# Patient Record
Sex: FEMALE | Race: OTHER | URBAN - METROPOLITAN AREA
[De-identification: names, ages, dates, MRNs, and addresses within clinical notes are randomized per-mention and may not be internally consistent; named-entity substitution may affect disease eponyms.]

---

## 2022-04-26 ENCOUNTER — TRANSCRIBE ORDER (OUTPATIENT)
Dept: SCHEDULING | Age: 47
End: 2022-04-26

## 2022-04-26 DIAGNOSIS — Z12.31 ENCOUNTER FOR SCREENING MAMMOGRAM FOR MALIGNANT NEOPLASM OF BREAST: Primary | ICD-10-CM

## 2022-11-11 RX ORDER — CETIRIZINE HYDROCHLORIDE 10 MG/1
10 TABLET ORAL EVERY EVENING
COMMUNITY

## 2022-11-11 RX ORDER — VENLAFAXINE HYDROCHLORIDE 225 MG/1
225 TABLET, EXTENDED RELEASE ORAL EVERY EVENING
COMMUNITY

## 2022-11-11 RX ORDER — BUTALBITAL, ACETAMINOPHEN AND CAFFEINE 50; 325; 40 MG/1; MG/1; MG/1
1-2 TABLET ORAL EVERY 4 HOURS PRN
COMMUNITY

## 2022-11-11 RX ORDER — DICLOFENAC SODIUM 75 MG/1
75 TABLET, DELAYED RELEASE ORAL 2 TIMES DAILY PRN
COMMUNITY

## 2022-11-11 RX ORDER — FAMOTIDINE 20 MG/1
20 TABLET, FILM COATED ORAL 2 TIMES DAILY
COMMUNITY

## 2022-11-11 RX ORDER — BUPROPION HYDROCHLORIDE 300 MG/1
300 TABLET ORAL EVERY MORNING
COMMUNITY

## 2022-11-11 RX ORDER — IBUPROFEN 200 MG
200 TABLET ORAL EVERY 6 HOURS PRN
COMMUNITY

## 2022-11-11 NOTE — PERIOP NOTE
Patient verified name and . Order for consent not found in EHR; patient verifies procedure. Type 3 surgery, Phone assessment complete. Orders not received. Labs per surgeon: none  Labs per anesthesia protocol: cbc,bmp - pt unable to go to out pt lab prior to surgery. Patient answered medical/surgical history questions at their best of ability. All prior to admission medications documented in [facility]. Patient instructed to take the following medications the day of surgery according to anesthesia guidelines with a small sip of water: wellbutrin, pepcid. Hold all vitamins 7 days prior to surgery and NSAIDS 5 days prior to surgery. Prescription meds to hold:motrin, diclofenac, mvi  Patient instructed on the following:    > Arrive at [facility], time of arrival to be called the day before by 1700  > NPO after midnight, unless otherwise indicated, including gum, mints, and ice chips  > Responsible adult must drive patient to the hospital, stay during surgery, and patient will need supervision 24 hours after anesthesia  > Use antibacterial soap in shower the night before surgery and on the morning of surgery  > All piercings must be removed prior to arrival.    > Leave all valuables (money and jewelry) at home but bring insurance card and ID on DOS.   > Do not wear make-up, nail polish, lotions, cologne, perfumes, powders, or oil on skin. Artificial nails are not permitted.

## 2022-11-12 NOTE — H&P
Name Dennie Cordoba (81YN, F) ID# [id number]    1975 Service [address]. [facility]  Provider Moinca Stallings MD  Insurance   Med Primary: [address] # : [id number]  Policy/Group # : [id number]  Employer Name : [employer]  Prescription: [address] - Member is eligible. details  Chief Complaint  Preop Major Surgery  Patient's Pharmacies  [facility] [address] [facility]): [phone] [address], [facility], Ph [phone], Fax [phone]  Vitals  None recorded. Allergies  Allergies not reviewed (last reviewed 2022)  PENICILLINS: Hives (Mild to moderate), Itching (Mild to moderate), Rash (Mild to moderate)   SULFA (SULFONAMIDE ANTIBIOTICS): Hives (Mild to moderate), Itching (Mild to moderate)   Medications  Medications not reviewed (last reviewed 2022)  buPROPion HCL  mg 24 hr tablet, extended release  22   filled surescripts  butalbital-acetaminophen-caffeine 50 mg-325 mg-40 mg tablet  22   filled surescripts  diclofenac sodium 75 mg tablet,delayed release  22   filled surescripts  DivigeL 1 mg/gram (0.1 %) transdermal gel packet  Apply 1 packet(s) every day by transdermal route for 30 days. 22   filled surescripts  drospirenone 3 mg-ethinyl estradioL 0.03 mg tablet  Take 1 tablet(s) every day by oral route as directed for 90 days. 10/12/22   filled surescripts  EstroGel 1.25 gram/actuation (0.06%) transdermal gel pump  Apply 1 pump(s) every day by topical route. 21   prescribed Nathalia Gerber MD  ibuprofen 800 mg tablet  Take 1 tablet(s) every 6-8 hours by oral route. 22   prescribed MD Ana Laura Munroe Maintenance Pack 4 mcg vaginal insert  Insert 1 vaginal insert(s) twice a week by vaginal route.   22   prescribed MD Kristina Munroexxy Starter Pack 10 mcg vaginal insert, dose pack  INSERT 1 VAGINAL INSERT (10 MCG) BY VAGINAL ROUTE ONCE DAILY FOR 2 WEEKS THEN 1 INSERT (10 MCG) TWICE WEEKLY FOR DURATION OF USE  09/19/22   prescribed Joseluis Maria MD  metFORMIN 500 mg tablet  TAKE THREE TABLETS BY MOUTH AT BEDTIME  10/06/22   filled surescripts  oxyCODONE 5 mg tablet  Take 1 tablet(s) every 4 hours by oral route. 11/12/22   prescribed Joseluis Maria MD  rizatriptan 10 mg tablet  07/28/22   filled surescripts  topiramate 200 mg tablet  08/30/22   filled surescripts  Ubrelvy  as needed  11/02/22   entered Kayli Kim  venlafaxine  mg tablet,extended release 24 hr  08/30/22   filled surescripts  Zofran 4 mg tablet  Take 1 tablet(s) every 4-6 hours by oral route. 11/12/22   prescribed Joseluis Maria MD  ZyrTEC  11/02/22   entered [address] [place] [address]  None recorded. Problems  Reviewed Problems  Endometriosis of pelvis - Onset: 11/09/2022  Mixed urinary incontinence - Onset: 09/19/2022  Atypical squamous cells of undetermined significance on cervical Papanicolaou smear - Onset: 11/05/2022  Pelvic and perineal pain - Onset: 09/19/2022  Excessive menstruation with irregular cycle - Onset: 11/09/2022  Urinary incontinence - Onset: 09/14/2022  Urge incontinence of urine - Onset: 09/14/2022  Acne - Onset: 01/04/2021  Menopausal syndrome - Onset: 08/29/2020  Morbid obesity - Onset: 08/29/2020  Migraine - Onset: 06/17/2020  Anxiety - Onset: 01/04/2021  Atrophic vaginitis - Onset: 08/29/2020  Polycystic ovary syndrome - Onset: 06/17/2020  GYN History  GYN History not reviewed (last reviewed 10/31/2022)  HPV Vaccine: N.  Date of Last Pap Smear: 01/04/2021. Abnormal Pap: Y. Colposcopy: 01/01/2018. CRYO: N.  LEEP: N. Sexually Active?: Y. Sexual Problems?: N.  STIs/STDs: N.  Current Birth Control Method: BCPs. Age at Menarche: 17.  Menses Monthly: N. Date of LMP: 10/01/2022. Duration of Flow (days): 5. Flow: Moderate. Dysmenorrhea: N. PCOS: Y.  ENDOMETRIOSIS: N.  UTERINE FIBROIDS: N.  Hormone Replacement Therapy: N. Most Recent Mammogram: 01/13/2021.   Chronic Pelvic Pain: N.  Obstetric History  Obstetric History not reviewed (last reviewed 10/31/2022)  TOTAL FULL PRE AB. I AB. S ECTOPICS MULTIPLE LIVING  1 1      1  Family History  Family History not reviewed (last reviewed 02/18/2021)  Mother - Thyroidectomy    - Tremor    - Arthritis    - Congestive heart failure    - Diabetes mellitus  Father - Malignant neoplastic disease  - 2 bn tumors removed,one on skull and one on upper spine  Maternal Uncle - Diabetes mellitus  - x 3  Maternal Grandfather - Myocardial infarction  - Believed  No breast/ovarian/uterine/colon ca. No DVT, CVA, PE.   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  Marriage and Sexuality  How many children do you have?: 1  Substance Use  Do you or have you ever smoked tobacco?: Never smoker  How much tobacco do you smoke?: None  Do you or have you ever used e-cigarettes or vape?: Never used electronic cigarettes  How many years have you smoked tobacco?: 0  Do you or have you ever used smokeless tobacco?: Never used smokeless tobacco  Which illicit or recreational drugs have you used?: No use  What was the date of your most recent tobacco screening?: 01/18/2021  What is your level of alcohol consumption?: None  What is your level of caffeine consumption?: None  Other  Live alone or with others?: with others  Marital status:   Surgical History  Surgical History not reviewed (last reviewed 05/17/2021)  Procedure on shoulder - Dr Naomy An  Sinus Surgery - Nasal  Orthopedic Surgery - 01/15/2015    Kidney stone removal  Past Medical History  Past Medical History not reviewed (last reviewed 05/17/2021)  Acne: Y  Anxiety Disorder: Y  Endometriosis: Y  Headaches: Y - migraine, rx amitripyline Yaw Torres NP @ [facility] 01/2021  History of abnormal pap: Y  Infertility: Y  Polycystic ovary syndrome: Y  Postpartum depression: Y  Notes: Obesity tx'd w/Contrave or adipex since at least 6/19 per notes  Broken R metatarsal in 09/2020  HPI  Pre-op for surgery on 11/15/22  ROS  Patient reports no fever, no night sweats, no significant weight gain, no significant weight loss, no exercise intolerance, no chills, and no malaise. She reports no dry eyes, no vision change, no irritation, and no eye disease/injury. She reports no difficulty hearing and no ear pain. She reports no frequent nosebleeds, no nose problems, and no sinus problems. She reports no sore throat, no bleeding gums, no snoring, no dry mouth, no mouth ulcers, no oral abnormalities, no teeth problems, no ringing in the ears, and no sinusitis. She reports no chest pain, no arm pain on exertion, no shortness of breath when walking, no shortness of breath when lying down, no palpitations, no known heart murmur, and no ankle swelling. She reports no cough, no wheezing, no shortness of breath, no coughing up blood, and no sleep apnea. She reports no abdominal pain, no nausea, no vomiting, no constipation, normal appetite, no diarrhea, not vomiting blood, no dyspepsia, and no GERD. She reports no incontinence, no difficulty urinating, no hematuria, and no increased frequency. She reports no muscle aches, no muscle weakness, no arthralgias/joint pain, no back pain, no swelling in the extremities, no neck pain, no difficulty walking, no cramps, no osteoporosis, and no fractures. She reports no abnormal mole, no jaundice, no rashes, no laceration, no non-healing areas, no changes in hair/nails, no psoriasis, no change in skin color, and no breast lump. She reports no loss of consciousness, no weakness, no numbness, no seizures, no dizziness, no migraines, no headaches, no tremor, no gait dysfunction, and no paralysis.  She reports no depression, no sleep disturbances, feeling safe in a relationship, no alcohol abuse, no anxiety, no hallucinations, no suicidal thoughts, no mood swings, no memory loss, no agitation, no dementia, and no delirium. She reports no fatigue. She reports no swollen glands, no bruising, no excessive bleeding, no anemia, and no phlebitis. She reports no runny nose, no sinus pressure, no itching, no hives, and no frequent sneezing. Physical Exam  Constitutional: General Appearance: healthy-appearing, well-nourished, and well-developed. Level of Distress: NAD. Ambulation: ambulating normally. Psychiatric: Insight: good judgement. Mental Status: normal mood and affect and active and alert. Orientation: to time, place, and person. Memory: recent memory normal and remote memory normal.    Head: Head: normocephalic and atraumatic. Eyes: Lids and Conjunctivae: no discharge or pallor and non-injected. Pupils: PERRLA. Corneas: grossly intact. Fundoscopic: no exudates or hemorrhages and grossly normal except where noted. EOM: EOMI. Lens: clear. Sclerae: non-icteric. Vision: peripheral vision grossly intact and acuity grossly intact. ENMT: Ears: no lesions on external ear. Hearing: no hearing loss. Nose: no lesions on external nose, septal deviation, or nasal discharge and nares patent. Lips, Teeth, and Gums: no mouth or lip ulcers or bleeding gums and normal dentition. Oropharynx: no erythema or exudates and moist mucous membranes and tonsils not enlarged. Neck: Neck: FROM, trachea midline, and no masses. Lymph Nodes: no cervical LAD, supraclavicular LAD, axillary LAD, or inguinal LAD. Thyroid: no enlargement or nodules and non-tender. Lungs: Respiratory effort: no dyspnea. Percussion: no dullness, flatness, or hyperresonance. Auscultation: no wheezing, rales/crackles, or rhonchi and breath sounds normal, good air movement, and CTA except as noted. Cardiovascular: Heart Auscultation: normal S1 and S2; no murmurs, rubs, or gallops; and RRR.  Neck vessels: no carotid bruits. Pulses including femoral / pedal: normal throughout. Abdomen: Bowel Sounds: normal. Inspection and Palpation: no tenderness, guarding, masses, rebound tenderness, or CVA tenderness and soft and non-distended. Hernia: none palpable. Musculoskeletal[de-identified] Motor Strength and Tone: normal tone and motor strength. Joints, Bones, and Muscles: no contractures, malalignment, tenderness, or bony abnormalities and normal movement of all extremities. Extremities: no cyanosis, edema, varicosities, or palpable cord. Neurologic: Gait and Station: normal gait and station. Cranial Nerves: grossly intact. Sensation: grossly intact and monofilament test intact. Reflexes: DTRs 2+ bilaterally throughout. Coordination and Cerebellum: finger-to-nose intact and no tremor. Skin: Inspection and palpation: no rash, lesions, ulcer, induration, nodules, jaundice, or abnormal nevi and good turgor. Nails: normal.    Back: Thoracolumbar Appearance: normal curvature. Assessment / Plan  PROCEDURE: RATLH/BS/A&P/TVT    The risks of the procedure were discussed in detail, including hemorrhage, blood clots, infection, damage to other organs such as bowel, bladder, ureters, nerves and vessels. Also, the possible need for catheter use at home after the procedure was discussed. Pt understands that complications are not anticipated, but that if they should occur then corrective procedures would be performed as indicated including, but not limited to:  transfusion, antibiotics and additional surgical procedures including modification/extension of incision (possible vertical incision), need for prolonged catheter drainage should urologic injury occur & other procedures as indicated. Unanticipated reactions to anesthesia as well as the small possibility of death were all discussed. All of the patient's questions were answered. Time away from work and physical restrictions discussed.  She was encouraged to call if she has additional questions/concerns prior to the procedure. Patient understands, has signed the consent forms, and states wants to proceed with the procedure. 1. Excessive menstruation with irregular cycle  N92.1: Excessive and frequent menstruation with irregular cycle  oxycodone 5 mg tablet - Take 1 tablet(s) every 4 hours by oral route. Qty: 30 tablet(s)     Refills: 0     Pharmacy: [id number]  Zofran 4 mg tablet - Take 1 tablet(s) every 4-6 hours by oral route. Qty: 30 tablet(s)     Refills: 2     Pharmacy: [id number]  ibuprofen 800 mg tablet - Take 1 tablet(s) every 6-8 hours by oral route. Qty: 120 tablet(s)     Refills: 1     Pharmacy: [id number]    2. Endometriosis of pelvis  N80.343: Deep endometriosis of the bilateral pelvic sidewall    3. Pelvic and perineal pain  R10.2: Pelvic and perineal pain    4. Mixed urinary incontinence  N39.46: Mixed incontinence    5. Midline cystocele  N81.11: Cystocele, midline    6.  Herniation of rectum into vagina  N81.6: Rectocele      Return to Office  Lele Garcia MD for PREOP/PO/PP at [facility] on 01/11/2023 at 11:30 AM

## 2022-11-15 ENCOUNTER — ANESTHESIA EVENT (OUTPATIENT)
Dept: SURGERY | Age: 47
End: 2022-11-15
Payer: COMMERCIAL

## 2022-11-15 ENCOUNTER — ANESTHESIA (OUTPATIENT)
Dept: SURGERY | Age: 47
End: 2022-11-15
Payer: COMMERCIAL

## 2022-11-15 ENCOUNTER — HOSPITAL ENCOUNTER (OUTPATIENT)
Age: 47
LOS: 1 days | Discharge: HOME | End: 2022-11-16
Attending: OBSTETRICS & GYNECOLOGY | Admitting: OBSTETRICS & GYNECOLOGY
Payer: COMMERCIAL

## 2022-11-15 PROBLEM — N92.1 EXCESSIVE MENSTRUATION WITH IRREGULAR CYCLE: Status: ACTIVE | Noted: 2022-11-15

## 2022-11-15 LAB
ABO + RH BLD: NORMAL
ANION GAP SERPL CALC-SCNC: 5 MMOL/L (ref 2–11)
APPEARANCE UR: ABNORMAL
BACTERIA URNS QL MICRO: NEGATIVE /HPF
BILIRUB UR QL: NEGATIVE
BLOOD GROUP ANTIBODIES SERPL: NORMAL
BUN SERPL-MCNC: 7 MG/DL (ref 6–23)
CALCIUM SERPL-MCNC: 9.3 MG/DL (ref 8.3–10.4)
CASTS URNS QL MICRO: ABNORMAL /LPF
CHLORIDE SERPL-SCNC: 108 MMOL/L (ref 101–110)
CO2 SERPL-SCNC: 25 MMOL/L (ref 21–32)
COLOR UR: ABNORMAL
CREAT SERPL-MCNC: 0.91 MG/DL (ref 0.6–1)
EPI CELLS #/AREA URNS HPF: ABNORMAL /HPF
ERYTHROCYTE [DISTWIDTH] IN BLOOD BY AUTOMATED COUNT: 12.9 % (ref 11.9–14.6)
GLUCOSE SERPL-MCNC: 95 MG/DL (ref 65–100)
GLUCOSE UR STRIP.AUTO-MCNC: NEGATIVE MG/DL
HCG UR QL: NEGATIVE
HCT VFR BLD AUTO: 43.3 % (ref 35.8–46.3)
HGB BLD-MCNC: 14.6 G/DL (ref 11.7–15.4)
HGB UR QL STRIP: ABNORMAL
KETONES UR QL STRIP.AUTO: ABNORMAL MG/DL
LEUKOCYTE ESTERASE UR QL STRIP.AUTO: NEGATIVE
MCH RBC QN AUTO: 30.3 PG (ref 26.1–32.9)
MCHC RBC AUTO-ENTMCNC: 33.7 G/DL (ref 31.4–35)
MCV RBC AUTO: 89.8 FL (ref 82–102)
NITRITE UR QL STRIP.AUTO: NEGATIVE
NRBC # BLD: 0 K/UL (ref 0–0.2)
PH UR STRIP: 6 [PH] (ref 5–9)
PLATELET # BLD AUTO: 442 K/UL (ref 150–450)
PMV BLD AUTO: 8.4 FL (ref 9.4–12.3)
POTASSIUM SERPL-SCNC: 3.5 MMOL/L (ref 3.5–5.1)
PROT UR STRIP-MCNC: NEGATIVE MG/DL
RBC # BLD AUTO: 4.82 M/UL (ref 4.05–5.2)
RBC #/AREA URNS HPF: ABNORMAL /HPF
SODIUM SERPL-SCNC: 138 MMOL/L (ref 133–143)
SP GR UR REFRACTOMETRY: 1.02 (ref 1–1.02)
SPECIMEN EXP DATE BLD: NORMAL
UROBILINOGEN UR QL STRIP.AUTO: 1 EU/DL (ref 0.2–1)
WBC # BLD AUTO: 8.3 K/UL (ref 4.3–11.1)
WBC URNS QL MICRO: ABNORMAL /HPF

## 2022-11-15 PROCEDURE — 6370000000 HC RX 637 (ALT 250 FOR IP): Performed by: OBSTETRICS & GYNECOLOGY

## 2022-11-15 PROCEDURE — 2580000003 HC RX 258: Performed by: OBSTETRICS & GYNECOLOGY

## 2022-11-15 PROCEDURE — 2580000003 HC RX 258: Performed by: ANESTHESIOLOGY

## 2022-11-15 PROCEDURE — 6360000002 HC RX W HCPCS: Performed by: NURSE ANESTHETIST, CERTIFIED REGISTERED

## 2022-11-15 PROCEDURE — 3600000019 HC SURGERY ROBOT ADDTL 15MIN: Performed by: OBSTETRICS & GYNECOLOGY

## 2022-11-15 PROCEDURE — 81025 URINE PREGNANCY TEST: CPT

## 2022-11-15 PROCEDURE — 2720000010 HC SURG SUPPLY STERILE: Performed by: OBSTETRICS & GYNECOLOGY

## 2022-11-15 PROCEDURE — 51701 INSERT BLADDER CATHETER: CPT

## 2022-11-15 PROCEDURE — 2709999900 HC NON-CHARGEABLE SUPPLY: Performed by: OBSTETRICS & GYNECOLOGY

## 2022-11-15 PROCEDURE — 2500000003 HC RX 250 WO HCPCS: Performed by: NURSE ANESTHETIST, CERTIFIED REGISTERED

## 2022-11-15 PROCEDURE — 3600000009 HC SURGERY ROBOT BASE: Performed by: OBSTETRICS & GYNECOLOGY

## 2022-11-15 PROCEDURE — 2500000003 HC RX 250 WO HCPCS: Performed by: OBSTETRICS & GYNECOLOGY

## 2022-11-15 PROCEDURE — 6370000000 HC RX 637 (ALT 250 FOR IP): Performed by: ANESTHESIOLOGY

## 2022-11-15 PROCEDURE — C1771 REP DEV, URINARY, W/SLING: HCPCS | Performed by: OBSTETRICS & GYNECOLOGY

## 2022-11-15 PROCEDURE — S2900 ROBOTIC SURGICAL SYSTEM: HCPCS | Performed by: OBSTETRICS & GYNECOLOGY

## 2022-11-15 PROCEDURE — 81001 URINALYSIS AUTO W/SCOPE: CPT

## 2022-11-15 PROCEDURE — 3700000000 HC ANESTHESIA ATTENDED CARE: Performed by: OBSTETRICS & GYNECOLOGY

## 2022-11-15 PROCEDURE — 3700000001 HC ADD 15 MINUTES (ANESTHESIA): Performed by: OBSTETRICS & GYNECOLOGY

## 2022-11-15 PROCEDURE — 7100000000 HC PACU RECOVERY - FIRST 15 MIN: Performed by: OBSTETRICS & GYNECOLOGY

## 2022-11-15 PROCEDURE — 88307 TISSUE EXAM BY PATHOLOGIST: CPT

## 2022-11-15 PROCEDURE — 80048 BASIC METABOLIC PNL TOTAL CA: CPT

## 2022-11-15 PROCEDURE — 85027 COMPLETE CBC AUTOMATED: CPT

## 2022-11-15 PROCEDURE — 6360000002 HC RX W HCPCS: Performed by: ANESTHESIOLOGY

## 2022-11-15 PROCEDURE — 86900 BLOOD TYPING SEROLOGIC ABO: CPT

## 2022-11-15 PROCEDURE — 6360000002 HC RX W HCPCS: Performed by: OBSTETRICS & GYNECOLOGY

## 2022-11-15 PROCEDURE — 7100000001 HC PACU RECOVERY - ADDTL 15 MIN: Performed by: OBSTETRICS & GYNECOLOGY

## 2022-11-15 PROCEDURE — C1765 ADHESION BARRIER: HCPCS | Performed by: OBSTETRICS & GYNECOLOGY

## 2022-11-15 PROCEDURE — 51798 US URINE CAPACITY MEASURE: CPT

## 2022-11-15 DEVICE — SYSTEM SLNG POLYPR SGL INCIS DEL DEV FOR STRESS URIN INCONT: Type: IMPLANTABLE DEVICE | Site: VAGINA | Status: FUNCTIONAL

## 2022-11-15 RX ORDER — SODIUM CHLORIDE, SODIUM LACTATE, POTASSIUM CHLORIDE, CALCIUM CHLORIDE 600; 310; 30; 20 MG/100ML; MG/100ML; MG/100ML; MG/100ML
INJECTION, SOLUTION INTRAVENOUS CONTINUOUS
Status: DISCONTINUED | OUTPATIENT
Start: 2022-11-15 | End: 2022-11-15 | Stop reason: HOSPADM

## 2022-11-15 RX ORDER — OXYCODONE HYDROCHLORIDE 5 MG/1
10 TABLET ORAL EVERY 4 HOURS PRN
Status: DISCONTINUED | OUTPATIENT
Start: 2022-11-15 | End: 2022-11-16 | Stop reason: HOSPADM

## 2022-11-15 RX ORDER — ROCURONIUM BROMIDE 10 MG/ML
INJECTION, SOLUTION INTRAVENOUS PRN
Status: DISCONTINUED | OUTPATIENT
Start: 2022-11-15 | End: 2022-11-15 | Stop reason: SDUPTHER

## 2022-11-15 RX ORDER — ACETAMINOPHEN 500 MG
1000 TABLET ORAL EVERY 6 HOURS
Status: DISCONTINUED | OUTPATIENT
Start: 2022-11-15 | End: 2022-11-16 | Stop reason: HOSPADM

## 2022-11-15 RX ORDER — ACETAMINOPHEN 500 MG
1000 TABLET ORAL ONCE
Status: COMPLETED | OUTPATIENT
Start: 2022-11-15 | End: 2022-11-15

## 2022-11-15 RX ORDER — SODIUM CHLORIDE 9 MG/ML
INJECTION, SOLUTION INTRAVENOUS PRN
Status: DISCONTINUED | OUTPATIENT
Start: 2022-11-15 | End: 2022-11-16 | Stop reason: HOSPADM

## 2022-11-15 RX ORDER — SODIUM CHLORIDE 0.9 % (FLUSH) 0.9 %
5-40 SYRINGE (ML) INJECTION EVERY 12 HOURS SCHEDULED
Status: DISCONTINUED | OUTPATIENT
Start: 2022-11-15 | End: 2022-11-15 | Stop reason: HOSPADM

## 2022-11-15 RX ORDER — ONDANSETRON 2 MG/ML
4 INJECTION INTRAMUSCULAR; INTRAVENOUS
Status: DISCONTINUED | OUTPATIENT
Start: 2022-11-15 | End: 2022-11-15 | Stop reason: HOSPADM

## 2022-11-15 RX ORDER — KETAMINE HYDROCHLORIDE 50 MG/ML
INJECTION, SOLUTION, CONCENTRATE INTRAMUSCULAR; INTRAVENOUS PRN
Status: DISCONTINUED | OUTPATIENT
Start: 2022-11-15 | End: 2022-11-15 | Stop reason: SDUPTHER

## 2022-11-15 RX ORDER — PROCHLORPERAZINE EDISYLATE 5 MG/ML
5 INJECTION INTRAMUSCULAR; INTRAVENOUS
Status: DISCONTINUED | OUTPATIENT
Start: 2022-11-15 | End: 2022-11-15 | Stop reason: HOSPADM

## 2022-11-15 RX ORDER — ONDANSETRON 2 MG/ML
INJECTION INTRAMUSCULAR; INTRAVENOUS PRN
Status: DISCONTINUED | OUTPATIENT
Start: 2022-11-15 | End: 2022-11-15 | Stop reason: SDUPTHER

## 2022-11-15 RX ORDER — HYDROMORPHONE HYDROCHLORIDE 1 MG/ML
0.5 INJECTION, SOLUTION INTRAMUSCULAR; INTRAVENOUS; SUBCUTANEOUS EVERY 5 MIN PRN
Status: DISCONTINUED | OUTPATIENT
Start: 2022-11-15 | End: 2022-11-15 | Stop reason: HOSPADM

## 2022-11-15 RX ORDER — SODIUM CHLORIDE 0.9 % (FLUSH) 0.9 %
5-40 SYRINGE (ML) INJECTION PRN
Status: DISCONTINUED | OUTPATIENT
Start: 2022-11-15 | End: 2022-11-16 | Stop reason: HOSPADM

## 2022-11-15 RX ORDER — HYDROMORPHONE HYDROCHLORIDE 1 MG/ML
0.25 INJECTION, SOLUTION INTRAMUSCULAR; INTRAVENOUS; SUBCUTANEOUS EVERY 5 MIN PRN
Status: DISCONTINUED | OUTPATIENT
Start: 2022-11-15 | End: 2022-11-15 | Stop reason: HOSPADM

## 2022-11-15 RX ORDER — ONDANSETRON 2 MG/ML
4 INJECTION INTRAMUSCULAR; INTRAVENOUS EVERY 6 HOURS PRN
Status: DISCONTINUED | OUTPATIENT
Start: 2022-11-15 | End: 2022-11-16 | Stop reason: HOSPADM

## 2022-11-15 RX ORDER — DIPHENHYDRAMINE HYDROCHLORIDE 50 MG/ML
12.5 INJECTION INTRAMUSCULAR; INTRAVENOUS
Status: DISCONTINUED | OUTPATIENT
Start: 2022-11-15 | End: 2022-11-15 | Stop reason: HOSPADM

## 2022-11-15 RX ORDER — SODIUM CHLORIDE 0.9 % (FLUSH) 0.9 %
5-40 SYRINGE (ML) INJECTION EVERY 12 HOURS SCHEDULED
Status: DISCONTINUED | OUTPATIENT
Start: 2022-11-15 | End: 2022-11-16 | Stop reason: HOSPADM

## 2022-11-15 RX ORDER — OXYCODONE HYDROCHLORIDE 5 MG/1
5 TABLET ORAL PRN
Status: COMPLETED | OUTPATIENT
Start: 2022-11-15 | End: 2022-11-15

## 2022-11-15 RX ORDER — NEOSTIGMINE METHYLSULFATE 1 MG/ML
INJECTION, SOLUTION INTRAVENOUS PRN
Status: DISCONTINUED | OUTPATIENT
Start: 2022-11-15 | End: 2022-11-15 | Stop reason: SDUPTHER

## 2022-11-15 RX ORDER — PROPOFOL 10 MG/ML
INJECTION, EMULSION INTRAVENOUS PRN
Status: DISCONTINUED | OUTPATIENT
Start: 2022-11-15 | End: 2022-11-15 | Stop reason: SDUPTHER

## 2022-11-15 RX ORDER — OXYCODONE HYDROCHLORIDE 5 MG/1
10 TABLET ORAL PRN
Status: COMPLETED | OUTPATIENT
Start: 2022-11-15 | End: 2022-11-15

## 2022-11-15 RX ORDER — ONDANSETRON 4 MG/1
4 TABLET, ORALLY DISINTEGRATING ORAL EVERY 8 HOURS PRN
Status: DISCONTINUED | OUTPATIENT
Start: 2022-11-15 | End: 2022-11-16 | Stop reason: HOSPADM

## 2022-11-15 RX ORDER — FENTANYL CITRATE 50 UG/ML
INJECTION, SOLUTION INTRAMUSCULAR; INTRAVENOUS PRN
Status: DISCONTINUED | OUTPATIENT
Start: 2022-11-15 | End: 2022-11-15 | Stop reason: SDUPTHER

## 2022-11-15 RX ORDER — LIDOCAINE HYDROCHLORIDE 20 MG/ML
INJECTION, SOLUTION EPIDURAL; INFILTRATION; INTRACAUDAL; PERINEURAL PRN
Status: DISCONTINUED | OUTPATIENT
Start: 2022-11-15 | End: 2022-11-15 | Stop reason: SDUPTHER

## 2022-11-15 RX ORDER — OXYCODONE HYDROCHLORIDE 5 MG/1
5 TABLET ORAL EVERY 4 HOURS PRN
Status: DISCONTINUED | OUTPATIENT
Start: 2022-11-15 | End: 2022-11-16 | Stop reason: HOSPADM

## 2022-11-15 RX ORDER — SODIUM CHLORIDE 0.9 % (FLUSH) 0.9 %
5-40 SYRINGE (ML) INJECTION PRN
Status: DISCONTINUED | OUTPATIENT
Start: 2022-11-15 | End: 2022-11-15 | Stop reason: HOSPADM

## 2022-11-15 RX ORDER — SODIUM CHLORIDE 9 MG/ML
INJECTION, SOLUTION INTRAVENOUS PRN
Status: DISCONTINUED | OUTPATIENT
Start: 2022-11-15 | End: 2022-11-15 | Stop reason: HOSPADM

## 2022-11-15 RX ORDER — LIDOCAINE HYDROCHLORIDE AND EPINEPHRINE BITARTRATE 20; .01 MG/ML; MG/ML
INJECTION, SOLUTION SUBCUTANEOUS PRN
Status: DISCONTINUED | OUTPATIENT
Start: 2022-11-15 | End: 2022-11-15 | Stop reason: HOSPADM

## 2022-11-15 RX ORDER — HYDROMORPHONE HYDROCHLORIDE 1 MG/ML
0.5 INJECTION, SOLUTION INTRAMUSCULAR; INTRAVENOUS; SUBCUTANEOUS
Status: DISCONTINUED | OUTPATIENT
Start: 2022-11-15 | End: 2022-11-16 | Stop reason: HOSPADM

## 2022-11-15 RX ORDER — EPHEDRINE SULFATE/0.9% NACL/PF 50 MG/5 ML
SYRINGE (ML) INTRAVENOUS PRN
Status: DISCONTINUED | OUTPATIENT
Start: 2022-11-15 | End: 2022-11-15 | Stop reason: SDUPTHER

## 2022-11-15 RX ORDER — MIDAZOLAM HYDROCHLORIDE 2 MG/2ML
2 INJECTION, SOLUTION INTRAMUSCULAR; INTRAVENOUS
Status: COMPLETED | OUTPATIENT
Start: 2022-11-15 | End: 2022-11-15

## 2022-11-15 RX ORDER — KETOROLAC TROMETHAMINE 30 MG/ML
INJECTION, SOLUTION INTRAMUSCULAR; INTRAVENOUS PRN
Status: DISCONTINUED | OUTPATIENT
Start: 2022-11-15 | End: 2022-11-15 | Stop reason: SDUPTHER

## 2022-11-15 RX ORDER — GLYCOPYRROLATE 0.2 MG/ML
INJECTION INTRAMUSCULAR; INTRAVENOUS PRN
Status: DISCONTINUED | OUTPATIENT
Start: 2022-11-15 | End: 2022-11-15 | Stop reason: SDUPTHER

## 2022-11-15 RX ORDER — LIDOCAINE HYDROCHLORIDE 10 MG/ML
1 INJECTION, SOLUTION INFILTRATION; PERINEURAL
Status: DISCONTINUED | OUTPATIENT
Start: 2022-11-15 | End: 2022-11-15 | Stop reason: HOSPADM

## 2022-11-15 RX ORDER — IBUPROFEN 800 MG/1
800 TABLET ORAL EVERY 8 HOURS
Status: DISCONTINUED | OUTPATIENT
Start: 2022-11-15 | End: 2022-11-16 | Stop reason: HOSPADM

## 2022-11-15 RX ORDER — DEXAMETHASONE SODIUM PHOSPHATE 10 MG/ML
INJECTION INTRAMUSCULAR; INTRAVENOUS PRN
Status: DISCONTINUED | OUTPATIENT
Start: 2022-11-15 | End: 2022-11-15 | Stop reason: SDUPTHER

## 2022-11-15 RX ORDER — APREPITANT 40 MG/1
40 CAPSULE ORAL ONCE
Status: COMPLETED | OUTPATIENT
Start: 2022-11-15 | End: 2022-11-15

## 2022-11-15 RX ADMIN — Medication 2000 MG: at 09:59

## 2022-11-15 RX ADMIN — Medication 10 MG: at 10:41

## 2022-11-15 RX ADMIN — SODIUM CHLORIDE, POTASSIUM CHLORIDE, SODIUM LACTATE AND CALCIUM CHLORIDE 125 ML/HR: 600; 310; 30; 20 INJECTION, SOLUTION INTRAVENOUS at 09:35

## 2022-11-15 RX ADMIN — KETAMINE HYDROCHLORIDE 10 MG: 50 INJECTION, SOLUTION INTRAMUSCULAR; INTRAVENOUS at 11:11

## 2022-11-15 RX ADMIN — PHENYLEPHRINE HYDROCHLORIDE 100 MCG: 0.1 INJECTION, SOLUTION INTRAVENOUS at 12:15

## 2022-11-15 RX ADMIN — KETAMINE HYDROCHLORIDE 40 MG: 50 INJECTION, SOLUTION INTRAMUSCULAR; INTRAVENOUS at 10:08

## 2022-11-15 RX ADMIN — PHENYLEPHRINE HYDROCHLORIDE 100 MCG: 0.1 INJECTION, SOLUTION INTRAVENOUS at 11:51

## 2022-11-15 RX ADMIN — APREPITANT 40 MG: 40 CAPSULE ORAL at 09:43

## 2022-11-15 RX ADMIN — FENTANYL CITRATE 25 MCG: 50 INJECTION, SOLUTION INTRAMUSCULAR; INTRAVENOUS at 13:11

## 2022-11-15 RX ADMIN — FENTANYL CITRATE 100 MCG: 50 INJECTION, SOLUTION INTRAMUSCULAR; INTRAVENOUS at 10:08

## 2022-11-15 RX ADMIN — LIDOCAINE HYDROCHLORIDE 60 MG: 20 INJECTION, SOLUTION EPIDURAL; INFILTRATION; INTRACAUDAL; PERINEURAL at 10:08

## 2022-11-15 RX ADMIN — ROCURONIUM BROMIDE 50 MG: 50 INJECTION, SOLUTION INTRAVENOUS at 10:08

## 2022-11-15 RX ADMIN — PROPOFOL 200 MG: 10 INJECTION, EMULSION INTRAVENOUS at 10:08

## 2022-11-15 RX ADMIN — DEXAMETHASONE SODIUM PHOSPHATE 4 MG: 10 INJECTION INTRAMUSCULAR; INTRAVENOUS at 10:16

## 2022-11-15 RX ADMIN — MIDAZOLAM 2 MG: 1 INJECTION INTRAMUSCULAR; INTRAVENOUS at 09:44

## 2022-11-15 RX ADMIN — FENTANYL CITRATE 25 MCG: 50 INJECTION, SOLUTION INTRAMUSCULAR; INTRAVENOUS at 13:18

## 2022-11-15 RX ADMIN — PHENYLEPHRINE HYDROCHLORIDE 100 MCG: 0.1 INJECTION, SOLUTION INTRAVENOUS at 10:08

## 2022-11-15 RX ADMIN — GLYCOPYRROLATE 0.4 MG: 0.2 INJECTION, SOLUTION INTRAMUSCULAR; INTRAVENOUS at 13:08

## 2022-11-15 RX ADMIN — ACETAMINOPHEN 1000 MG: 500 TABLET, FILM COATED ORAL at 16:26

## 2022-11-15 RX ADMIN — HYDROMORPHONE HYDROCHLORIDE 0.5 MG: 1 INJECTION, SOLUTION INTRAMUSCULAR; INTRAVENOUS; SUBCUTANEOUS at 14:03

## 2022-11-15 RX ADMIN — OXYCODONE 5 MG: 5 TABLET ORAL at 14:23

## 2022-11-15 RX ADMIN — SODIUM CHLORIDE, SODIUM LACTATE, POTASSIUM CHLORIDE, AND CALCIUM CHLORIDE 125 ML/HR: 600; 310; 30; 20 INJECTION, SOLUTION INTRAVENOUS at 09:44

## 2022-11-15 RX ADMIN — ACETAMINOPHEN 1000 MG: 500 TABLET, FILM COATED ORAL at 09:36

## 2022-11-15 RX ADMIN — ROCURONIUM BROMIDE 10 MG: 50 INJECTION, SOLUTION INTRAVENOUS at 11:15

## 2022-11-15 RX ADMIN — PHENYLEPHRINE HYDROCHLORIDE 50 MCG: 0.1 INJECTION, SOLUTION INTRAVENOUS at 10:26

## 2022-11-15 RX ADMIN — ACETAMINOPHEN 1000 MG: 500 TABLET, FILM COATED ORAL at 22:37

## 2022-11-15 RX ADMIN — SODIUM CHLORIDE, SODIUM LACTATE, POTASSIUM CHLORIDE, AND CALCIUM CHLORIDE: 600; 310; 30; 20 INJECTION, SOLUTION INTRAVENOUS at 13:23

## 2022-11-15 RX ADMIN — PHENYLEPHRINE HYDROCHLORIDE 100 MCG: 0.1 INJECTION, SOLUTION INTRAVENOUS at 11:40

## 2022-11-15 RX ADMIN — FENTANYL CITRATE 25 MCG: 50 INJECTION, SOLUTION INTRAMUSCULAR; INTRAVENOUS at 13:26

## 2022-11-15 RX ADMIN — ROCURONIUM BROMIDE 10 MG: 50 INJECTION, SOLUTION INTRAVENOUS at 12:02

## 2022-11-15 RX ADMIN — KETAMINE HYDROCHLORIDE 10 MG: 50 INJECTION, SOLUTION INTRAMUSCULAR; INTRAVENOUS at 12:03

## 2022-11-15 RX ADMIN — ONDANSETRON 4 MG: 2 INJECTION INTRAMUSCULAR; INTRAVENOUS at 10:16

## 2022-11-15 RX ADMIN — HYDROMORPHONE HYDROCHLORIDE 0.5 MG: 1 INJECTION, SOLUTION INTRAMUSCULAR; INTRAVENOUS; SUBCUTANEOUS at 14:08

## 2022-11-15 RX ADMIN — FENTANYL CITRATE 25 MCG: 50 INJECTION, SOLUTION INTRAMUSCULAR; INTRAVENOUS at 13:13

## 2022-11-15 RX ADMIN — OXYCODONE 10 MG: 5 TABLET ORAL at 17:51

## 2022-11-15 RX ADMIN — Medication 3 MG: at 13:08

## 2022-11-15 RX ADMIN — PHENYLEPHRINE HYDROCHLORIDE 50 MCG: 0.1 INJECTION, SOLUTION INTRAVENOUS at 10:36

## 2022-11-15 RX ADMIN — IBUPROFEN 800 MG: 800 TABLET, FILM COATED ORAL at 22:37

## 2022-11-15 RX ADMIN — KETOROLAC TROMETHAMINE 30 MG: 30 INJECTION, SOLUTION INTRAMUSCULAR; INTRAVENOUS at 13:26

## 2022-11-15 ASSESSMENT — PAIN SCALES - GENERAL: PAINLEVEL_OUTOF10: 0

## 2022-11-15 ASSESSMENT — PAIN - FUNCTIONAL ASSESSMENT: PAIN_FUNCTIONAL_ASSESSMENT: 0-10

## 2022-11-15 NOTE — ANESTHESIA PRE PROCEDURE
Department of Anesthesiology  Preprocedure Note       Name:  Sherryle Masson   Age:  41 y.o.  :  1975                                          MRN:  [id number]         Date:  11/15/2022      Surgeon: Tiarra Nguyễn):  Bettie Wu MD    Procedure: Procedure(s):  RA TOTAL HYSTERECTOMY ABDOMINAL LAPAROSCOPIC ROBOTIC/ BILATERAL SALPINGECTOMY  TRANSVAGINAL TAPING  VAGINAL REPAIR ANTERIOR AND POSTERIOR    Medications prior to admission:   Prior to Admission medications    Medication Sig Start Date End Date Taking?  Authorizing Provider   metFORMIN (GLUCOPHAGE) 500 MG tablet Take 1,500 mg by mouth every evening   Yes Historical Provider, MD   cetirizine (ZYRTEC) 10 MG tablet Take 10 mg by mouth every evening   Yes Historical Provider, MD   butalbital-acetaminophen-caffeine (FIORICET, ESGIC) -40 MG per tablet Take 1-2 tablets by mouth every 4 hours as needed for Headaches   Yes Historical Provider, MD   buPROPion (WELLBUTRIN XL) 300 MG extended release tablet Take 300 mg by mouth every morning   Yes Historical Provider, MD   topiramate (TOPAMAX) 200 MG tablet Take 200 mg by mouth every evening   Yes Historical Provider, MD   venlafaxine 225 MG extended release tablet Take 225 mg by mouth every evening   Yes Historical Provider, MD   diclofenac (VOLTAREN) 75 MG EC tablet Take 75 mg by mouth 2 times daily as needed for Pain   Yes Historical Provider, MD   famotidine (PEPCID AC MAXIMUM STRENGTH) 20 MG tablet Take 20 mg by mouth 2 times daily   Yes Historical Provider, MD   NONFORMULARY BCP every evening   Yes Historical Provider, MD   Multiple Vitamin (MULTIVITAMIN PO) Take by mouth every evening   Yes Historical Provider, MD   Ubrogepant (UBRELVY PO) Take by mouth as needed   Yes Historical Provider, MD   ibuprofen (ADVIL;MOTRIN) 200 MG tablet Take 200 mg by mouth every 6 hours as needed for Pain   Yes Historical Provider, MD       Current medications:    Current Facility-Administered Medications   Medication Dose Route Frequency Provider Last Rate Last Admin    lactated ringers infusion   IntraVENous Continuous Manju Ponce MD        sodium chloride flush 0.9 % injection 5-40 mL  5-40 mL IntraVENous 2 times per day Manju Ponce MD        sodium chloride flush 0.9 % injection 5-40 mL  5-40 mL IntraVENous PRN Manju Ponce MD        0.9 % sodium chloride infusion   IntraVENous PRN Manju Ponce MD        ceFAZolin (ANCEF) 2000 mg in sterile water 20 mL IV syringe  2,000 mg IntraVENous On Call to Romie Joseph MD        lidocaine 1 % injection 1 mL  1 mL IntraDERmal Once PRN Georgia Lard IV, MD        acetaminophen (TYLENOL) tablet 1,000 mg  1,000 mg Oral Once Georgia Lard IV, MD        lactated ringers infusion   IntraVENous Continuous Georgia Lard IV, MD        sodium chloride flush 0.9 % injection 5-40 mL  5-40 mL IntraVENous 2 times per day Georgia Lard IV, MD        sodium chloride flush 0.9 % injection 5-40 mL  5-40 mL IntraVENous PRN Georgia Nighatd IV, MD        0.9 % sodium chloride infusion   IntraVENous PRN Georgia Lard IV, MD        midazolam PF (VERSED) injection 2 mg  2 mg IntraVENous Once PRN Georgia Lard IV, MD        aprepitant (EMEND) capsule 40 mg  40 mg Oral Once Carter Becerril MD           Allergies:     Allergies   Allergen Reactions    Pcn [Penicillins] Hives    Sulfa Antibiotics Hives       Problem List:    Patient Active Problem List   Diagnosis Code    Infertility, female N80.10    History of kidney problems Z87.448    PCOS (polycystic ovarian syndrome) E28.2    Excessive menstruation with irregular cycle N92.1       Past Medical History:        Diagnosis Date    Anxiety     Kidney stone     Migraines     PCOS (polycystic ovarian syndrome)        Past Surgical History:        Procedure Laterality Date    KIDNEY STONE REMOVAL      SHOULDER ARTHROSCOPY Right     WISDOM TOOTH EXTRACTION         Social History:    Social History     Tobacco Use    Smoking functional status     Neuro/Psych:   (+) headaches: migraine headaches, depression/anxiety             GI/Hepatic/Renal: Neg GI/Hepatic/Renal ROS            Endo/Other:                      ROS comment: PCOS Abdominal:              PE comment: Deferred   Vascular: negative vascular ROS. Other Findings:           Anesthesia Plan      general     ASA 1       Induction: intravenous. Anesthetic plan and risks discussed with patient.                         Alexandra Lozano MD   11/15/2022

## 2022-11-15 NOTE — ANESTHESIA POSTPROCEDURE EVALUATION
Department of Anesthesiology  Postprocedure Note    Patient: Sanjuana Tillman  MRN: [de-identified]  YOB: 1975  Date of evaluation: 11/15/2022      Procedure Summary     Date: 11/15/22 Room / Location: [facility] MAIN OR 07 / [facility] MAIN OR    Anesthesia Start: 5105 Anesthesia Stop: 0973    Procedures:       RA TOTAL HYSTERECTOMY ABDOMINAL LAPAROSCOPIC ROBOTIC/ BILATERAL SALPINGECTOMY (Abdomen)      TRANSVAGINAL TAPING (Vagina )      VAGINAL REPAIR ANTERIOR AND POSTERIOR (Vagina ) Diagnosis:       Excessive, frequent and irregular menstruation      Endometriosis      Pelvic and perineal pain      Mixed incontinence      Secondary dysmenorrhea      (Excessive, frequent and irregular menstruation [N92.1])      (Endometriosis [N80.9])      (Pelvic and perineal pain [R10.2])      (Mixed incontinence [N39.46])      (Secondary dysmenorrhea [N94.5])    Surgeons: Samm Roa MD Responsible Provider: Robe Story MD    Anesthesia Type: general ASA Status: 1          Anesthesia Type: No value filed. Becky Phase I: Becky Score: 8    Becky Phase II:        Anesthesia Post Evaluation    Patient location during evaluation: PACU  Patient participation: complete - patient participated  Level of consciousness: awake and alert  Airway patency: patent  Nausea & Vomiting: no nausea  Cardiovascular status: hemodynamically stable  Respiratory status: acceptable  Hydration status: euvolemic  Comments: Pt has been mildly tachycardic in PACU since robinul given in OR. She is well hydrated, comfortable and afebrile. She has a David catheter. Ok to d/c to floor. Pt is hurting a little and was just given an oral pain med.

## 2022-11-15 NOTE — OP NOTE
Robot Assisted Laparoscopic Hysterectomy/Bilateral Salpingectomies Procedure Note    Pre-operative Diagnosis: Excessive, frequent and irregular menstruation [N92.1]  Endometriosis [N80.9]  Pelvic and perineal pain [R10.2]  Mixed incontinence [N39.46]  Secondary dysmenorrhea [N94.5]  Cystocele  Rectocele    Post-operative Diagnosis: same + bowel adhesions    Surgeon:  Tavares Armstrong MD     Assistant:  trista    Anesthesia: General    Procedure: Robotic assisted total laparoscopic hysterectomy, bilateral salpingectomies, bowel adhesiolysis, anterior and posterior repairs, TVT, cystoscopy  EBL:20cc    PROCEDURE: The patient was taken to the operating room where general   anesthesia was found to be adequate. Patient was prepped and draped in   normal sterile fashion. A David catheter was placed into the urethra. A weighted speculum was placed in the vagina. The anterior lip of the cervix was grasped with a single-tooth tenaculum. The   uterus was sounded to 7 cm. The cervix was dilated with Azeem dilators and a Dilineator uterine manipulator was inserted in the endometrial cavity for   means of manipulation. All other instruments were then removed from the vagina. The patient was flattened so that attention could be turned to the abdomen. A 8 mm skin incision was made infraumbilically. The Veress needle was   then inserted at a 45-degree angle. Intraperitoneal placement was confirmed with a water-filled syringe and a drop in cO2 pressure with insufflation. Opening pressure was -2 mmHg. The abdomen was then insufflated to 3 liters of CO2 gas. Once the abdomen was insufflated, an 8 mm skin incision was made in the LUQ in the midclavicular line. The insufflating port was inserted under direct   visualization using an Optiscope. Intraperitoneal placement was confirmed again with the scope.   Two more 8 mm skin incisions were made in the right lower quadrant and left lower quadrant, approximately 10 to 12 cm from the umbilicus on either side. Two robotic ports were then placed in these incisions under   direct visualization as well as one infraumbilically. Hemostasis was assured throughout. Survey of the patient's abdomen revealed a normal liver edge and a normal appendix. The uterus was enlarged to 8 weeks size. Both ovaries and tubes appeared normal.  The bladder reflection was clean. She had bowel adhesions on the left abdominal wall. At this point, the robot was then docked and I unscrubbed to take over at the   robotic console. A bipolar fenestrated and a monopolar Endo leila were used   robotically to perform the procedure. Both fallopian tubes were dissected off the ovarian pedicles to be removed with the uterus. The utero-ovarian ligaments on   both sides were cauterized and transected with good hemostasis. Round   ligaments were then also cauterized and transected with good hemostasis. The uterine arteries were then skeletonized bilaterally. The bladder flap   was then created with blunt and sharp dissection. The bladder was then   gently pushed down and off of the lower uterine segment. The uterine   arteries were then cauterized bilaterally and transected. Hemostasis was   seen throughout. The cardinal and uterosacral ligaments were   then also cauterized and transected bilaterally. Good hemostasis was   assured throughout. Monopolar scissors were then used to enter the   vaginal canal.  The cervix and uterus were then amputated in a circumferential fashion   using the monopolar scissors over the Vcare cup. Once the uterus was   completely detached, it was removed from the vagina by pulling the manipulator out through the vagina. The vagina was packed with moist towels and a 12 mm open trocar was placed through the towels into the vaginal opening as a transducer for needle passing. The vaginal cuff was then closed with a running Vlock suture. Good   hemostasis was seen throughout.  Interceed was then placed over the closed   vaginal cuff. Good hemostasis was seen throughout. The robot was   undocked. A cystoscopy was then performed. There was no trauma seen in the bladder or urethra. Both ureters jetted urine. The cystoscope was removed and the David catheter replaced. The trocars were then removed. Dermabond was used to close the skin incisions. Patient tolerated the procedure well. A weighted speculum was placed in the vagina. The vagina was grasped at the cuff and at 2-3 cm below the urethral meatus with Allis clamps. The area between was injected with 1 % lidocaine with epinephrine and incised with a scalpel. Allis and [place] clamps were used to hold the edges. The vescicovaginal fascia was sharply and bluntly dissected off the anterior vaginal wall on both sides. The fascia was then reapproximated in the midline in several layers with 2-0 Vicryl. The excess vaginal skin was then trimmed with Metzenbaum scissors. The vaginal epithelium was then reapproximated with 2-0 Vicryl Figure of 8 sutures. Good hemostasis was obtained. An Michelel Jean-Baptiste was used to grasp the vaginal cuff again. Then two other Allis's were placed at the perineal fornix. The area was injected with 1% lidocaine with epinephrine and incised with the scalpel. A small perineorrhaphy was  Performed. Then the overlying vaginal epithelium was carefully  dissected sharply and bluntly off of the puborectal fascia. The  puborectal fascia was reinforced in a site specific manner using 2-0  Vicryl figure of eight sutures. The pubovesical fascia was  reapproximated in the midline using 2-0 Vicryl figure of eight sutures  interrupted to close down the rectocele. The excess vaginal skin was  then trimmed with Metzenbaum scissors. The vaginal epithelium was  re-approximated with 2-0 Vicryl figure of eight stitches. Good  hemostasis was seen throughout. The perineum was then closed using a 4-0  Vicryl subcuticularly.      The bladder was fully drained with the David catheter. The anterior vaginal epitheliun was grasped with an Allis 2 to 3 cm below the urethral meatus and another Allis was used to grasp directly beneath this another 2 to 3 cm. The area between the two Allis was injected with 1% lidocaine with epinephrine and incised with a scalpel. The vesicovaginal fascia was bluntly dissected with Metzenbaum scissors on either side of the urethra up to the pelvic bone. The bladder was then fully drained with a David catheter again. A David bladder catheter guide was used to direct the bladder down and away from the patients left hand side. The left hand insertion piece of the TVT Solyx device was then inserted through the opening into the vesicovaginal fascia and behind the pelvic bone in a U-type fashion. The bladder was then directed down and away from the patients right hand side and the right hand insertion piece of the TVT was then inserted through the opening through the vesicovaginal fascia and up behind the pelvic bone and again in a U-type fashion. The bladder catheter guide was then removed and a cystoscope was performed. The bladder was filled to about 500 ccs of normal saline. Survey of the patients bladder showed ureteral spillage bilaterally. She had no trauma or bladder perforation noted. Her urethra was intact. The cystoscope was removed. A crude was performed with moderate pressure on the abdomen and no loss of urine was noted. A 21 Fr Azeem dilator was passed through the urethra. There was no retention or speed bump. The TVT Solyx insertion piece was then gently removed. The anterior vaginal epithelium was reapproximated with 2-0 Vicryl figure of 8 sutures. Good hemostasis was seen throughout the pelvis. The bladder was fully drained again with the Advid catheter. All instruments were removed from the vagina and good hemostasis was again reassured. The vagina was then packed with a moist packing with premarin cream applied.  The patient tolerated the procedure well. Sponge, lap, and needle counts were correct times two and the patient was taken to recovery in stable condition.

## 2022-11-16 VITALS
DIASTOLIC BLOOD PRESSURE: 69 MMHG | BODY MASS INDEX: 28.28 KG/M2 | SYSTOLIC BLOOD PRESSURE: 108 MMHG | TEMPERATURE: 97.7 F | WEIGHT: 176 LBS | OXYGEN SATURATION: 100 % | HEART RATE: 82 BPM | RESPIRATION RATE: 17 BRPM | HEIGHT: 66 IN

## 2022-11-16 PROBLEM — R19.8 ABDOMINAL COMPLAINTS: Status: ACTIVE | Noted: 2022-11-16

## 2022-11-16 PROCEDURE — 2580000003 HC RX 258: Performed by: OBSTETRICS & GYNECOLOGY

## 2022-11-16 PROCEDURE — 6370000000 HC RX 637 (ALT 250 FOR IP): Performed by: OBSTETRICS & GYNECOLOGY

## 2022-11-16 PROCEDURE — 51798 US URINE CAPACITY MEASURE: CPT

## 2022-11-16 RX ADMIN — OXYCODONE 5 MG: 5 TABLET ORAL at 05:29

## 2022-11-16 RX ADMIN — OXYCODONE 5 MG: 5 TABLET ORAL at 01:32

## 2022-11-16 RX ADMIN — SODIUM CHLORIDE, PRESERVATIVE FREE 10 ML: 5 INJECTION INTRAVENOUS at 04:18

## 2022-11-16 RX ADMIN — IBUPROFEN 800 MG: 800 TABLET, FILM COATED ORAL at 06:04

## 2022-11-16 RX ADMIN — ACETAMINOPHEN 1000 MG: 500 TABLET, FILM COATED ORAL at 04:11

## 2022-11-16 ASSESSMENT — PAIN SCALES - GENERAL
PAINLEVEL_OUTOF10: 3
PAINLEVEL_OUTOF10: 5
PAINLEVEL_OUTOF10: 6

## 2022-11-16 ASSESSMENT — PAIN DESCRIPTION - LOCATION
LOCATION: ABDOMEN

## 2022-11-16 ASSESSMENT — PAIN DESCRIPTION - DESCRIPTORS
DESCRIPTORS: ACHING
DESCRIPTORS: ACHING

## 2022-11-16 NOTE — PROGRESS NOTES
Due to time from last void and equipment issues (dead/missing battery) Will attempt to scan after next void. Verbalized understanding to call RN for assistance out of bed.

## 2022-11-16 NOTE — PROGRESS NOTES
Per Dr. Simi Rashid patient may be discharged tonight after 2nd void if bladder scan less than 150cc. Pt. And Spouse Glenn Palacios) notified and agree with plan of care.

## 2022-11-16 NOTE — PROGRESS NOTES
Bladder scan after void = 60cc. Verb. Understanding to call RN when she needs to void again. Spouse Benita Madisyn) at bedside.

## 2022-11-16 NOTE — PROCEDURES
0700 Bedside and Verbal shift change report given to BERNARDO Russell RN (oncoming nurse) by Nasra Story RN (offgoing nurse). Report included the following information Nurse Handoff Report. [id number] Pt. Up to bathroom. Able to void 350 ml. Bladder scan showers 81ml residual. Dr. Chris Webb called per RN. D/C to home orders received. [id number] Discharge instructions given to pt. Questions answered. Pt. Taken downstairs in wheelchair.  is driving her home. Pt. States she will follow up in the office with Dr. Chris Webb.

## 2022-11-16 NOTE — PROGRESS NOTES
2210-Up to void-Voided 300 cc.  2220-Bladder Scan as ordered post jbez=130.  2225-I&O Cath. For post void retention=200 clear yellow urine. Patient states she would prefer to stay overnight and do discharge tomorrow. Verbalized understanding to call RN for assistance out of bed. Bilateral below the knee SCDs reapplied after voiding and returning to bed. SCDs are on and functioning. Spouse remains at bedside. Verbalized understanding to call for needs or concerns.

## 2022-11-16 NOTE — PROGRESS NOTES
Up to void. Ambulating well without difficulty. Voided 350 mL. Bladder scan after void = 200. Will continue to monitor output.

## 2022-11-16 NOTE — PROGRESS NOTES
Unable to perform bladder scan after voiding related equipment issue (dead battery) new battery called for.

## (undated) DEVICE — ROBOTIC HYSTERECTOMY: Brand: MEDLINE INDUSTRIES, INC.

## (undated) DEVICE — SUTURE VCRL SZ 2-0 L27IN ABSRB VLT L26MM UR-6 5/8 CIR J602H

## (undated) DEVICE — SUTURE CAPIO MONODEK SZ 0 L48IN ABSRB 1/2 CIR DBL ARMED 833137

## (undated) DEVICE — SYNCHROSEAL: Brand: DA VINCI ENERGY

## (undated) DEVICE — CYSTO/BLADDER IRRIGATION SET, REGULATING CLAMP

## (undated) DEVICE — TUBING, SUCTION, 1/4" X 10', STRAIGHT: Brand: MEDLINE

## (undated) DEVICE — GLOVE SURG SZ 65 THK91MIL LTX FREE SYN POLYISOPRENE

## (undated) DEVICE — SUTURE VCRL SZ 3-0 L27IN ABSRB UD L26MM CT-2 1/2 CIR J232H

## (undated) DEVICE — LITHOTOMY: Brand: MEDLINE INDUSTRIES, INC.

## (undated) DEVICE — APPLICATOR MEDICATED 26 CC SOLUTION HI LT ORNG CHLORAPREP

## (undated) DEVICE — SOLUTION IV 1000ML LAC RINGERS PH 6.5 INJ USP VIAFLX PLAS

## (undated) DEVICE — ARM DRAPE

## (undated) DEVICE — SUTURE VCRL SZ 2-0 L27IN ABSRB UD L26MM CT-2 1/2 CIR J269H

## (undated) DEVICE — AIRSEAL 5 MM ACCESS PORT AND LOW PROFILE OBTURATOR WITH BLADELESS OPTICAL TIP, 120 MM LENGTH: Brand: AIRSEAL

## (undated) DEVICE — SYRINGE MED 10ML LUERLOCK TIP W/O SFTY DISP

## (undated) DEVICE — SUTURE ABSORBABLE MONOFILAMENT 0 CT-1 36 MM DYED SPRL PDS + SXPP1B450

## (undated) DEVICE — BARRIER ADH W3XL4IN UTER PELV ABSRB GYNECARE INTCEED

## (undated) DEVICE — CANISTER, RIGID, 2000CC: Brand: MEDLINE INDUSTRIES, INC.

## (undated) DEVICE — PACKING GZ W2INXL6FT WVN COT VAG RADPQ

## (undated) DEVICE — SHEET,DRAPE,53X77,STERILE: Brand: MEDLINE

## (undated) DEVICE — TOTAL TRAY, DB, 100% SILI FOLEY, 16FR 10: Brand: MEDLINE

## (undated) DEVICE — CATHETER URETH 18FR BLLN 5CC SIL ALLY W/ SIL HYDRGEL 2 W F

## (undated) DEVICE — DRAPE,UNDERBUTTOCKS,PCH,STERILE: Brand: MEDLINE

## (undated) DEVICE — GARMENT,MEDLINE,DVT,INT,CALF,MED, GEN2: Brand: MEDLINE

## (undated) DEVICE — COVER MPLR TIP CRV SCIS ACC DA VINCI

## (undated) DEVICE — PAD PT POS 36 IN SURGYPAD DISP

## (undated) DEVICE — BLADELESS OBTURATOR: Brand: WECK VISTA

## (undated) DEVICE — MANIPULATOR UTER 3CM ULTEM PLAS CUP DELINEATOR FOR USE W/

## (undated) DEVICE — SOLUTION IRRIGATION H2O 0797305] ICU MEDICAL INC]

## (undated) DEVICE — SOLUTION IRRIG 1000ML 0.9% SOD CHL USP POUR PLAS BTL

## (undated) DEVICE — SOLUTION IRRIG 1000ML STRL H2O USP PLAS POUR BTL

## (undated) DEVICE — ADHESIVE SKIN CLSR 0.7ML TOP DERMBND ADV

## (undated) DEVICE — SUTURE VCRL SZ 2-0 L18IN ABSRB VLT L26MM SH 1/2 CIR J775D

## (undated) DEVICE — CANNULA SEAL

## (undated) DEVICE — YANKAUER,BULB TIP,W/O VENT,RIGID,STERILE: Brand: MEDLINE

## (undated) DEVICE — COLUMN DRAPE